# Patient Record
Sex: FEMALE | Race: WHITE | NOT HISPANIC OR LATINO | URBAN - METROPOLITAN AREA
[De-identification: names, ages, dates, MRNs, and addresses within clinical notes are randomized per-mention and may not be internally consistent; named-entity substitution may affect disease eponyms.]

---

## 2017-02-22 ENCOUNTER — APPOINTMENT (RX ONLY)
Dept: URBAN - METROPOLITAN AREA CLINIC 22 | Facility: CLINIC | Age: 52
Setting detail: DERMATOLOGY
End: 2017-02-22

## 2017-02-22 DIAGNOSIS — Z41.9 ENCOUNTER FOR PROCEDURE FOR PURPOSES OTHER THAN REMEDYING HEALTH STATE, UNSPECIFIED: ICD-10-CM

## 2017-02-22 PROCEDURE — ? FILLERS

## 2017-02-22 PROCEDURE — BOTOX: HCPCS

## 2017-02-22 PROCEDURE — ? BOTOX

## 2017-02-22 ASSESSMENT — LOCATION DETAILED DESCRIPTION DERM
LOCATION DETAILED: RIGHT CENTRAL MALAR CHEEK
LOCATION DETAILED: RIGHT SUPERIOR ANTERIOR NECK
LOCATION DETAILED: LEFT CENTRAL MALAR CHEEK

## 2017-02-22 ASSESSMENT — LOCATION ZONE DERM
LOCATION ZONE: FACE
LOCATION ZONE: NECK

## 2017-02-22 ASSESSMENT — LOCATION SIMPLE DESCRIPTION DERM
LOCATION SIMPLE: RIGHT CHEEK
LOCATION SIMPLE: RIGHT ANTERIOR NECK
LOCATION SIMPLE: LEFT CHEEK

## 2017-02-22 NOTE — PROCEDURE: FILLERS
Mid Face Filler Volume In Cc: 0
Additional Area 1 Volume In Cc: 0.3
Lot #: [id number]
Administered By (Optional): Dr. Park
Price $ (Numeric Only, No Text Please.): 850.00
Filler: Juvederm Voluma XC
Additional Area 2 Location: left malar region
Additional Area 1 Location: right malar region
Show Price In Note?: yes
Detail Level: Simple
Consent: Written consent obtained. Risks include but not limited to bruising, beading, irregular texture, ulceration, infection, allergic reaction, scar formation, incomplete augmentation, temporary nature, procedural pain.
Use Map Statement For Sites (Optional): No
Expiration Date (Month Year): 06/2018

## 2017-02-22 NOTE — PROCEDURE: BOTOX
Perioral Units: 0
Administered By (Optional): Paul Park
Additional Area 1 Units: 7.5
Summary Price $ (Use Numbers Only, No Text Please.): 97.50
Detail Level: Simple
Postcare Instructions: Patient instructed to not lie down for 4 hours and limit physical activity for 24 hours. Patient instructed not to travel by airplane for 48 hours.
Use Map Statement For Sites (Optional): No
Additional Area 1 Location: neck
Dilution (U/0.1 Cc): 4
Reconstitution Date: 02/22/2017
Bill Summary Price Listed Below, Or Bill Total Of Units X Price Per Unit?: Bill Summary Price Below
Price Per Unit In $ (Use Numbers Only, No Text Please.): 13.00
Show Price In Note?: yes
Consent: Written consent was obtained prior to the procedure. Risks, benefits, expectations and alternatives were discussed including, but not limited to, infection, bleeding, lid/brow ptosis, bruising, swelling, diplopia, temporary effects, incomplete chemical denervation and dissatisfaction with the cosmetic outcome. No guarantee or warranty was given or implied regarding longevity of results.

## 2017-05-17 ENCOUNTER — APPOINTMENT (RX ONLY)
Dept: URBAN - METROPOLITAN AREA CLINIC 22 | Facility: CLINIC | Age: 52
Setting detail: DERMATOLOGY
End: 2017-05-17

## 2017-05-17 DIAGNOSIS — Z41.9 ENCOUNTER FOR PROCEDURE FOR PURPOSES OTHER THAN REMEDYING HEALTH STATE, UNSPECIFIED: ICD-10-CM

## 2017-05-17 PROCEDURE — ? FILLERS

## 2017-05-17 PROCEDURE — 99024 POSTOP FOLLOW-UP VISIT: CPT

## 2017-05-17 ASSESSMENT — LOCATION SIMPLE DESCRIPTION DERM: LOCATION SIMPLE: LEFT CHEEK

## 2017-05-17 ASSESSMENT — LOCATION DETAILED DESCRIPTION DERM: LOCATION DETAILED: LEFT CENTRAL MALAR CHEEK

## 2017-05-17 ASSESSMENT — LOCATION ZONE DERM: LOCATION ZONE: FACE

## 2017-05-17 NOTE — PROCEDURE: FILLERS
Decollete Filler Volume In Cc: 0
Price $ (Numeric Only, No Text Please.): 0.00
Administered By (Optional): Dr. Park
Topical Anesthetic #1: lidocaine
Show Price In Note?: yes
Filler: Juvederm Voluma XC
Detail Level: Simple
Use Map Statement For Sites (Optional): No
Topical Anesthetic #2: prilocaine
Injected Anesthesia In Cc: 0.3
Topical Anesthetic Base: ointment
Consent: Written consent obtained. Risks include but not limited to bruising, beading, irregular texture, ulceration, infection, allergic reaction, scar formation, incomplete augmentation, temporary nature, procedural pain.
Topical Anesthetic #3: phenenylephrine
Additional Area 1 Location: left cheek

## 2017-09-12 ENCOUNTER — APPOINTMENT (RX ONLY)
Dept: URBAN - METROPOLITAN AREA CLINIC 22 | Facility: CLINIC | Age: 52
Setting detail: DERMATOLOGY
End: 2017-09-12

## 2017-09-12 DIAGNOSIS — Z41.9 ENCOUNTER FOR PROCEDURE FOR PURPOSES OTHER THAN REMEDYING HEALTH STATE, UNSPECIFIED: ICD-10-CM

## 2017-09-12 PROCEDURE — ? FILLERS

## 2017-09-12 ASSESSMENT — LOCATION DETAILED DESCRIPTION DERM
LOCATION DETAILED: RIGHT CENTRAL MALAR CHEEK
LOCATION DETAILED: LEFT CENTRAL MALAR CHEEK

## 2017-09-12 ASSESSMENT — LOCATION SIMPLE DESCRIPTION DERM
LOCATION SIMPLE: RIGHT CHEEK
LOCATION SIMPLE: LEFT CHEEK

## 2017-09-12 ASSESSMENT — LOCATION ZONE DERM: LOCATION ZONE: FACE

## 2018-05-04 ENCOUNTER — APPOINTMENT (RX ONLY)
Dept: URBAN - METROPOLITAN AREA CLINIC 22 | Facility: CLINIC | Age: 53
Setting detail: DERMATOLOGY
End: 2018-05-04

## 2018-05-04 DIAGNOSIS — Z41.1 ENCOUNTER FOR COSMETIC SURGERY: ICD-10-CM

## 2018-05-04 DIAGNOSIS — Z41.9 ENCOUNTER FOR PROCEDURE FOR PURPOSES OTHER THAN REMEDYING HEALTH STATE, UNSPECIFIED: ICD-10-CM

## 2018-05-04 PROCEDURE — Z1031: HCPCS

## 2018-05-04 PROCEDURE — ? CONSULTATION - LIPOSUCTION

## 2018-05-04 PROCEDURE — ? PATIENT SPECIFIC COUNSELING

## 2018-05-04 PROCEDURE — ? FILLERS

## 2018-05-04 PROCEDURE — ? ORDER FOR SURGERY

## 2018-05-04 ASSESSMENT — LOCATION ZONE DERM: LOCATION ZONE: FACE

## 2018-05-04 ASSESSMENT — LOCATION SIMPLE DESCRIPTION DERM
LOCATION SIMPLE: RIGHT CHEEK
LOCATION SIMPLE: LEFT CHEEK

## 2018-05-04 ASSESSMENT — LOCATION DETAILED DESCRIPTION DERM
LOCATION DETAILED: RIGHT MEDIAL MALAR CHEEK
LOCATION DETAILED: LEFT MEDIAL MALAR CHEEK

## 2018-05-04 NOTE — PROCEDURE: FILLERS
Marionette Lines Filler Volume In Cc: 0
Expiration Date (Month Year): 6/11/2019
Topical Anesthesia?: no
Consent: Written consent obtained. Risks include but not limited to bruising, beading, irregular texture, ulceration, infection, allergic reaction, scar formation, incomplete augmentation, temporary nature, procedural pain.
Lot #: [id number]
Filler: Juvederm Voluma XC
Additional Area 1 Volume In Cc: 0.3
Detail Level: Simple
Additional Area 2 Location: Left malar cheek
Show Price In Note?: yes
Topical Anesthetic Base: ointment
Additional Area 1 Location: Right malar cheek
Price $ (Numeric Only, No Text Please.): 164
Additional Area 2 Volume In Cc: 0.5
Administered By (Optional): Dr. Park

## 2018-05-04 NOTE — PROCEDURE: ORDER FOR SURGERY
Estimated Length Of Surgery: 1 hour
Date Of Surgery: TBD
Comments (Optional): $3,000
Detail Level: Simple
Surgery To Be Ordered: Liposuction of chin and neck
Admission Status: in office
Cosmetic, Major Or Minor?: Cosmetic
Surgeon: Dr. Park
Facility: [facility]

## 2018-05-04 NOTE — PROCEDURE: PATIENT SPECIFIC COUNSELING
Detail Level: Detailed
Other (Free Text): Physician discussed performing liposuction in office under local anesthesia.  Physician recommended patient take Valium before procedure after consents signed and also recommended patient have percocet rx for post-op pain management.

## 2019-10-25 ENCOUNTER — APPOINTMENT (RX ONLY)
Dept: URBAN - METROPOLITAN AREA CLINIC 11 | Facility: CLINIC | Age: 54
Setting detail: DERMATOLOGY
End: 2019-10-25

## 2019-10-25 DIAGNOSIS — Z41.9 ENCOUNTER FOR PROCEDURE FOR PURPOSES OTHER THAN REMEDYING HEALTH STATE, UNSPECIFIED: ICD-10-CM

## 2019-10-25 PROCEDURE — Z1031: HCPCS

## 2019-10-25 PROCEDURE — ? FILLERS

## 2019-10-25 ASSESSMENT — LOCATION SIMPLE DESCRIPTION DERM
LOCATION SIMPLE: RIGHT CHEEK
LOCATION SIMPLE: LEFT CHEEK

## 2019-10-25 ASSESSMENT — LOCATION DETAILED DESCRIPTION DERM
LOCATION DETAILED: LEFT CENTRAL MALAR CHEEK
LOCATION DETAILED: RIGHT CENTRAL MALAR CHEEK

## 2019-10-25 ASSESSMENT — LOCATION ZONE DERM: LOCATION ZONE: FACE

## 2019-10-25 NOTE — PROCEDURE: FILLERS
Vermillion Lips Filler Volume In Cc: 0
Show Price In Note?: yes
Expiration Date (Month Year): 2020-12-15
Topical Anesthetic Base: ointment
Price $ (Numeric Only, No Text Please.): 850.00
Topical Anesthetic #1: lidocaine
Use Map Statement For Sites (Optional): No
Consent: Written consent obtained. Risks include but not limited to bruising, beading, irregular texture, ulceration, infection, allergic reaction, scar formation, incomplete augmentation, temporary nature, procedural pain.
Filler: Juvederm Voluma XC
Topical Anesthetic #2: prilocaine
Additional Area 1 Location: right cheek
Lot #: [id number]
Additional Area 1 Volume In Cc: 0.4
Detail Level: Simple
Topical Anesthetic Time (In Min): 5
Additional Area 2 Location: left cheek
Topical Anesthetic #3: phenenylephrine
Administered By (Optional): Dr. Park

## 2021-06-24 ENCOUNTER — APPOINTMENT (RX ONLY)
Dept: URBAN - METROPOLITAN AREA CLINIC 11 | Facility: CLINIC | Age: 56
Setting detail: DERMATOLOGY
End: 2021-06-24

## 2021-06-24 DIAGNOSIS — Z41.1 ENCOUNTER FOR COSMETIC SURGERY: ICD-10-CM

## 2021-06-24 DIAGNOSIS — Z41.9 ENCOUNTER FOR PROCEDURE FOR PURPOSES OTHER THAN REMEDYING HEALTH STATE, UNSPECIFIED: ICD-10-CM

## 2021-06-24 PROCEDURE — Z1024: HCPCS

## 2021-06-24 PROCEDURE — ? FILLERS

## 2021-06-24 PROCEDURE — ? BOTOX

## 2021-06-24 PROCEDURE — ? CONSULTATION - AESTHETIC FACIAL DEFORMITY

## 2021-06-24 ASSESSMENT — LOCATION DETAILED DESCRIPTION DERM
LOCATION DETAILED: RIGHT INFERIOR MEDIAL MALAR CHEEK
LOCATION DETAILED: GLABELLA
LOCATION DETAILED: RIGHT CENTRAL MALAR CHEEK
LOCATION DETAILED: LEFT CENTRAL MALAR CHEEK

## 2021-06-24 ASSESSMENT — LOCATION SIMPLE DESCRIPTION DERM
LOCATION SIMPLE: RIGHT CHEEK
LOCATION SIMPLE: LEFT CHEEK
LOCATION SIMPLE: GLABELLA

## 2021-06-24 ASSESSMENT — LOCATION ZONE DERM: LOCATION ZONE: FACE

## 2021-06-24 NOTE — PROCEDURE: FILLERS
Lateral Canthal Filler Volume In Cc: 0
Additional Area 2 Location: left cheek
Topical Anesthetic Base: ointment
Detail Level: Simple
Consent: Written consent obtained. Risks include but not limited to bruising, beading, irregular texture, ulceration, infection, allergic reaction, scar formation, incomplete augmentation, temporary nature, procedural pain.
Topical Anesthesia?: no
Additional Area 2 Volume In Cc: 0.3
Expiration Date (Month Year): 2021-11-22
Administered By (Optional): Dr. Park
Show Price In Note?: yes
Price $ (Numeric Only, No Text Please.): 663
Filler: Juvederm Voluma XC
Injected Anesthesia In Cc: 0.6
Lot #: [id number]
Additional Area 1 Location: right cheek

## 2021-06-24 NOTE — PROCEDURE: BOTOX
Ethan Units: 0
Price Per Unit In $ (Use Numbers Only, No Text Please.): 14.00
Consent: Written consent was obtained prior to the procedure. Risks, benefits, expectations and alternatives were discussed including, but not limited to, infection, bleeding, lid/brow ptosis, bruising, swelling, diplopia, temporary effects, incomplete chemical denervation and dissatisfaction with the cosmetic outcome. No guarantee or warranty was given or implied regarding longevity of results.
Dilution (U/0.1 Cc): 2.5
Glabellar Complex Units: 12.5
Use Map Statement For Sites (Optional): No
Postcare Instructions: Patient instructed to not lie down for 4 hours and limit physical activity for 24 hours. Patient instructed not to travel by airplane for 48 hours.
Reconstitution Date: 06/24/2021
Show Price In Note?: yes
Administered By (Optional): Paul Park
Summary Price $ (Use Numbers Only, No Text Please.): 175
Detail Level: Simple
Bill Summary Price Listed Below, Or Bill Total Of Units X Price Per Unit?: Bill Summary Price Below

## 2021-07-22 ENCOUNTER — APPOINTMENT (RX ONLY)
Dept: URBAN - METROPOLITAN AREA CLINIC 11 | Facility: CLINIC | Age: 56
Setting detail: DERMATOLOGY
End: 2021-07-22

## 2021-07-22 DIAGNOSIS — Z41.9 ENCOUNTER FOR PROCEDURE FOR PURPOSES OTHER THAN REMEDYING HEALTH STATE, UNSPECIFIED: ICD-10-CM

## 2021-07-22 PROCEDURE — ? FILLERS

## 2021-07-22 PROCEDURE — ? BOTOX

## 2021-07-22 PROCEDURE — Z1024: HCPCS

## 2021-07-22 ASSESSMENT — LOCATION DETAILED DESCRIPTION DERM
LOCATION DETAILED: RIGHT CENTRAL MALAR CHEEK
LOCATION DETAILED: RIGHT CENTRAL ANTERIOR NECK
LOCATION DETAILED: LEFT CENTRAL MALAR CHEEK

## 2021-07-22 ASSESSMENT — LOCATION SIMPLE DESCRIPTION DERM
LOCATION SIMPLE: NECK
LOCATION SIMPLE: LEFT CHEEK
LOCATION SIMPLE: RIGHT CHEEK

## 2021-07-22 ASSESSMENT — LOCATION ZONE DERM
LOCATION ZONE: FACE
LOCATION ZONE: NECK

## 2021-07-22 NOTE — PROCEDURE: FILLERS
Mid Face Filler Volume In Cc: 0
Consent: Written consent obtained. Risks include but not limited to bruising, beading, irregular texture, ulceration, infection, allergic reaction, scar formation, incomplete augmentation, temporary nature, procedural pain.
Use Map Statement For Sites (Optional): No
Cheeks Filler Volume In Cc: 0.3
Lot #: [id number]
Show Price In Note?: yes
Administered By (Optional): Dr. Park
Filler: Juvederm Voluma XC
Detail Level: Simple
Expiration Date (Month Year): 11/22/21
Topical Anesthetic Base: ointment

## 2021-07-22 NOTE — PROCEDURE: BOTOX
Periorbital Skin Units: 0
Show Price In Note?: yes
Postcare Instructions: Patient instructed to not lie down for 4 hours and limit physical activity for 24 hours. Patient instructed not to travel by airplane for 48 hours.
Detail Level: Simple
Administered By (Optional): Paul Park
Platsyma Units: 13
Use Map Statement For Sites (Optional): No
Reconstitution Date: 07/22/2021
Summary Price $ (Use Numbers Only, No Text Please.): 182
Lot #: [id number]
Expiration Date (Month Year): 09/2023
Consent: Written consent was obtained prior to the procedure. Risks, benefits, expectations and alternatives were discussed including, but not limited to, infection, bleeding, lid/brow ptosis, bruising, swelling, diplopia, temporary effects, incomplete chemical denervation and dissatisfaction with the cosmetic outcome. No guarantee or warranty was given or implied regarding longevity of results.
Dilution (U/0.1 Cc): 2.5
Bill Summary Price Listed Below, Or Bill Total Of Units X Price Per Unit?: Bill Summary Price Below
Price Per Unit In $ (Use Numbers Only, No Text Please.): 14

## 2022-05-26 ENCOUNTER — APPOINTMENT (RX ONLY)
Dept: URBAN - METROPOLITAN AREA CLINIC 11 | Facility: CLINIC | Age: 57
Setting detail: DERMATOLOGY
End: 2022-05-26

## 2022-05-26 DIAGNOSIS — Z41.9 ENCOUNTER FOR PROCEDURE FOR PURPOSES OTHER THAN REMEDYING HEALTH STATE, UNSPECIFIED: ICD-10-CM

## 2022-05-26 PROCEDURE — ? BOTOX

## 2022-05-26 PROCEDURE — ? FILLERS

## 2022-05-26 PROCEDURE — Z1024: HCPCS

## 2022-05-26 ASSESSMENT — LOCATION SIMPLE DESCRIPTION DERM
LOCATION SIMPLE: RIGHT ANTERIOR NECK
LOCATION SIMPLE: LEFT EYEBROW
LOCATION SIMPLE: LEFT ANTERIOR NECK
LOCATION SIMPLE: LEFT TEMPLE
LOCATION SIMPLE: LEFT CHEEK
LOCATION SIMPLE: RIGHT EYEBROW
LOCATION SIMPLE: RIGHT TEMPLE
LOCATION SIMPLE: RIGHT CHEEK

## 2022-05-26 ASSESSMENT — LOCATION DETAILED DESCRIPTION DERM
LOCATION DETAILED: RIGHT CENTRAL MALAR CHEEK
LOCATION DETAILED: LEFT CENTRAL MALAR CHEEK
LOCATION DETAILED: LEFT SUPERIOR ANTERIOR NECK
LOCATION DETAILED: RIGHT LATERAL EYEBROW
LOCATION DETAILED: RIGHT INFERIOR TEMPLE
LOCATION DETAILED: RIGHT SUPERIOR ANTERIOR NECK
LOCATION DETAILED: LEFT INFERIOR TEMPLE
LOCATION DETAILED: LEFT LATERAL EYEBROW

## 2022-05-26 ASSESSMENT — LOCATION ZONE DERM
LOCATION ZONE: FACE
LOCATION ZONE: NECK

## 2022-05-26 NOTE — PROCEDURE: BOTOX
Perioral Units: 0
Lot #:  [id number]
Use Map Statement For Sites (Optional): No
Summary Price $ (Use Numbers Only, No Text Please.): 280
Platsyma Units: 20
Reconstitution Date: 05/19/2022
Postcare Instructions: Patient instructed to not lie down for 4 hours and limit physical activity for 24 hours. Patient instructed not to travel by airplane for 48 hours.
Administered By (Optional): Paul Park
Detail Level: Simple
Bill Summary Price Listed Below, Or Bill Total Of Units X Price Per Unit?: Bill Summary Price Below
Show Price In Note?: yes
Price Per Unit In $ (Use Numbers Only, No Text Please.): 14.00
Dilution (U/0.1 Cc): 2.5
Consent: Written consent was obtained prior to the procedure. Risks, benefits, expectations and alternatives were discussed including, but not limited to, infection, bleeding, lid/brow ptosis, bruising, swelling, diplopia, temporary effects, incomplete chemical denervation and dissatisfaction with the cosmetic outcome. No guarantee or warranty was given or implied regarding longevity of results.
Expiration Date (Month Year): 10/2023

## 2022-07-06 NOTE — PROCEDURE: FILLERS
Mental Crease Filler Volume In Cc: 0
Topical Anesthetic #1: lidocaine
Additional Area 2 Volume In Cc: 0.4
Consent: Written consent obtained. Risks include but not limited to bruising, beading, irregular texture, ulceration, infection, allergic reaction, scar formation, incomplete augmentation, temporary nature, procedural pain.
Filler: Juvederm Voluma XC
Administered By (Optional): Dr. Park
Detail Level: Simple
Yes
Show Price In Note?: yes
Topical Anesthetic #3: phenenylephrine
Additional Area 1 Location: right malar region
Topical Anesthetic Base: ointment
Injected Anesthesia In Cc: 0.3
Expiration Date (Month Year): 11/02/2018
Topical Anesthetic #2: prilocaine
Lot #: [id number]
Additional Area 2 Location: left malar region
Price $ (Numeric Only, No Text Please.): 850.00
Use Map Statement For Sites (Optional): No

## 2022-09-22 ENCOUNTER — APPOINTMENT (RX ONLY)
Dept: URBAN - METROPOLITAN AREA CLINIC 11 | Facility: CLINIC | Age: 57
Setting detail: DERMATOLOGY
End: 2022-09-22

## 2022-09-22 DIAGNOSIS — Z41.9 ENCOUNTER FOR PROCEDURE FOR PURPOSES OTHER THAN REMEDYING HEALTH STATE, UNSPECIFIED: ICD-10-CM

## 2022-09-22 PROCEDURE — ? BOTOX

## 2022-09-22 PROCEDURE — ? FILLERS

## 2022-09-22 PROCEDURE — BOTOX: HCPCS

## 2022-09-22 ASSESSMENT — LOCATION SIMPLE DESCRIPTION DERM
LOCATION SIMPLE: LEFT EYEBROW
LOCATION SIMPLE: RIGHT EYEBROW
LOCATION SIMPLE: RIGHT CHEEK
LOCATION SIMPLE: GLABELLA
LOCATION SIMPLE: LEFT CHEEK

## 2022-09-22 ASSESSMENT — LOCATION DETAILED DESCRIPTION DERM
LOCATION DETAILED: RIGHT CENTRAL MALAR CHEEK
LOCATION DETAILED: LEFT LATERAL EYEBROW
LOCATION DETAILED: LEFT CENTRAL MALAR CHEEK
LOCATION DETAILED: RIGHT LATERAL EYEBROW
LOCATION DETAILED: GLABELLA

## 2022-09-22 ASSESSMENT — LOCATION ZONE DERM: LOCATION ZONE: FACE

## 2022-09-22 NOTE — PROCEDURE: FILLERS
Lower Lips Filler Volume In Cc: 0
Price $ (Numeric Only, No Text Please.): 750
Detail Level: Simple
Topical Anesthetic Time (In Min): 7
Administered By (Optional): Dr. Park
Injected Anesthesia In Cc: 0.3
Consent: Written consent obtained. Risks include but not limited to bruising, beading, irregular texture, ulceration, infection, allergic reaction, scar formation, incomplete augmentation, temporary nature, procedural pain.
Show Price In Note?: yes
Filler: Restylane-L
Additional Area 1 Location: R cheek
Lot #: [id number]
Additional Area 1 Volume In Cc: 0.2
Use Map Statement For Sites (Optional): No
Additional Area 2 Location: L cheek
Topical Anesthetic Base: ointment
Expiration Date (Month Year): 11/30/2024

## 2022-09-22 NOTE — PROCEDURE: BOTOX
Reconstitution Date: 09/22/2022
Summary Price $ (Use Numbers Only, No Text Please.): 252
Detail Level: Simple
Platsyma Units: 0
Postcare Instructions: Patient instructed to not lie down for 4 hours and limit physical activity for 24 hours. Patient instructed not to travel by airplane for 48 hours.
Additional Area 1 Units: 2.5
Administered By (Optional): Paul Park
Additional Area 2 Location: L lat brow
Use Map Statement For Sites (Optional): No
Lot #: [id number]
Glabellar Complex Units: 12.5
Consent: Written consent was obtained prior to the procedure. Risks, benefits, expectations and alternatives were discussed including, but not limited to, infection, bleeding, lid/brow ptosis, bruising, swelling, diplopia, temporary effects, incomplete chemical denervation and dissatisfaction with the cosmetic outcome. No guarantee or warranty was given or implied regarding longevity of results.
Additional Area 1 Location: R lat brow
Expiration Date (Month Year): 10/2023
Bill Summary Price Listed Below, Or Bill Total Of Units X Price Per Unit?: Bill Summary Price Below
Show Price In Note?: yes
Price Per Unit In $ (Use Numbers Only, No Text Please.): 14.00

## 2023-02-23 ENCOUNTER — APPOINTMENT (RX ONLY)
Dept: URBAN - METROPOLITAN AREA CLINIC 11 | Facility: CLINIC | Age: 58
Setting detail: DERMATOLOGY
End: 2023-02-23

## 2023-02-23 DIAGNOSIS — Z41.9 ENCOUNTER FOR PROCEDURE FOR PURPOSES OTHER THAN REMEDYING HEALTH STATE, UNSPECIFIED: ICD-10-CM

## 2023-02-23 PROCEDURE — Z1024: HCPCS

## 2023-02-23 PROCEDURE — ? BOTOX

## 2023-02-23 ASSESSMENT — LOCATION DETAILED DESCRIPTION DERM
LOCATION DETAILED: GLABELLA
LOCATION DETAILED: LEFT LATERAL EYEBROW
LOCATION DETAILED: RIGHT LATERAL EYEBROW

## 2023-02-23 ASSESSMENT — LOCATION SIMPLE DESCRIPTION DERM
LOCATION SIMPLE: LEFT EYEBROW
LOCATION SIMPLE: GLABELLA
LOCATION SIMPLE: RIGHT EYEBROW

## 2023-02-23 ASSESSMENT — LOCATION ZONE DERM: LOCATION ZONE: FACE

## 2023-02-23 NOTE — PROCEDURE: BOTOX
Reconstitution Date: 02/09/2023
Summary Price $ (Use Numbers Only, No Text Please.): 322
Detail Level: Simple
Platsyma Units: 0
Postcare Instructions: Patient instructed to not lie down for 4 hours and limit physical activity for 24 hours. Patient instructed not to travel by airplane for 48 hours.
Additional Area 1 Units: 2.5
Administered By (Optional): Paul Park
Additional Area 3 Location: right platysmal
Additional Area 2 Location: L lat brow
Use Map Statement For Sites (Optional): No
Lot #: [id number]
Glabellar Complex Units: 12.5
Consent: Written consent was obtained prior to the procedure. Risks, benefits, expectations and alternatives were discussed including, but not limited to, infection, bleeding, lid/brow ptosis, bruising, swelling, diplopia, temporary effects, incomplete chemical denervation and dissatisfaction with the cosmetic outcome. No guarantee or warranty was given or implied regarding longevity of results.
Additional Area 1 Location: R lat brow
Expiration Date (Month Year): 02/2025
Additional Area 3 Units: 5
Bill Summary Price Listed Below, Or Bill Total Of Units X Price Per Unit?: Bill Summary Price Below
Show Price In Note?: yes
Price Per Unit In $ (Use Numbers Only, No Text Please.): 14.00

## 2023-02-23 NOTE — HPI: COSMETIC (BOTOX)
Have You Had Botox?: has had botox
Have You Had Dysport?: not pertinent
When Was Your Last Botox Treatment?: 09/22/2022

## 2023-05-25 ENCOUNTER — APPOINTMENT (RX ONLY)
Dept: URBAN - METROPOLITAN AREA CLINIC 11 | Facility: CLINIC | Age: 58
Setting detail: DERMATOLOGY
End: 2023-05-25

## 2023-05-25 DIAGNOSIS — Z41.9 ENCOUNTER FOR PROCEDURE FOR PURPOSES OTHER THAN REMEDYING HEALTH STATE, UNSPECIFIED: ICD-10-CM

## 2023-05-25 PROCEDURE — ? FILLERS

## 2023-05-25 ASSESSMENT — LOCATION DETAILED DESCRIPTION DERM
LOCATION DETAILED: RIGHT CENTRAL MALAR CHEEK
LOCATION DETAILED: LEFT CENTRAL MALAR CHEEK

## 2023-05-25 ASSESSMENT — LOCATION SIMPLE DESCRIPTION DERM
LOCATION SIMPLE: LEFT CHEEK
LOCATION SIMPLE: RIGHT CHEEK

## 2023-05-25 ASSESSMENT — LOCATION ZONE DERM: LOCATION ZONE: FACE

## 2023-05-25 NOTE — PROCEDURE: FILLERS
Lower Lips Filler Volume In Cc: 0
Price $ (Numeric Only, No Text Please.): 925
Detail Level: Simple
Administered By (Optional): Dr. Park
Injected Anesthesia In Cc: 0.3
Consent: Written consent obtained. Risks include but not limited to bruising, beading, irregular texture, ulceration, infection, allergic reaction, scar formation, incomplete augmentation, temporary nature, procedural pain.
Show Price In Note?: yes
Filler: Tenisha Bernal (Perlane-L)
Additional Area 1 Location: R cheek
Lot #: [id number]
Additional Area 1 Volume In Cc: 0.5
Use Map Statement For Sites (Optional): No
Additional Area 2 Location: L cheek
Topical Anesthetic Base: ointment
Expiration Date (Month Year): 05/31/2025

## 2023-06-29 ENCOUNTER — APPOINTMENT (RX ONLY)
Dept: URBAN - METROPOLITAN AREA CLINIC 11 | Facility: CLINIC | Age: 58
Setting detail: DERMATOLOGY
End: 2023-06-29

## 2023-06-29 DIAGNOSIS — Z41.9 ENCOUNTER FOR PROCEDURE FOR PURPOSES OTHER THAN REMEDYING HEALTH STATE, UNSPECIFIED: ICD-10-CM

## 2023-06-29 PROCEDURE — ? BOTOX

## 2023-06-29 PROCEDURE — Z1024: HCPCS

## 2023-06-29 ASSESSMENT — LOCATION SIMPLE DESCRIPTION DERM
LOCATION SIMPLE: RIGHT EYEBROW
LOCATION SIMPLE: LEFT EYEBROW
LOCATION SIMPLE: SUBMENTAL CHIN
LOCATION SIMPLE: GLABELLA

## 2023-06-29 ASSESSMENT — LOCATION DETAILED DESCRIPTION DERM
LOCATION DETAILED: RIGHT LATERAL EYEBROW
LOCATION DETAILED: LEFT LATERAL EYEBROW
LOCATION DETAILED: SUBMENTAL CHIN
LOCATION DETAILED: GLABELLA

## 2023-06-29 ASSESSMENT — LOCATION ZONE DERM
LOCATION ZONE: FACE
LOCATION ZONE: FACE

## 2023-06-29 NOTE — PROCEDURE: BOTOX
Use Map Statement For Sites (Optional): No
Lot #: [id number]
Perioral Units: 0
Show Price In Note?: yes
Detail Level: Simple
Summary Price $ (Use Numbers Only, No Text Please.): 322
Platsyma Units: 5
Postcare Instructions: Patient instructed to not lie down for 4 hours and limit physical activity for 24 hours. Patient instructed not to travel by airplane for 48 hours.
Additional Area 1 Units: 2.5
Administered By (Optional): Paul Park
Reconstitution Date: 06/29/2023
Bill Summary Price Listed Below, Or Bill Total Of Units X Price Per Unit?: Bill Summary Price Below
Price Per Unit In $ (Use Numbers Only, No Text Please.): 13.00
Additional Area 2 Location: L lat brow
Expiration Date (Month Year): 2025/05
Glabellar Complex Units: 12.5
Consent: Written consent was obtained prior to the procedure. Risks, benefits, expectations and alternatives were discussed including, but not limited to, infection, bleeding, lid/brow ptosis, bruising, swelling, diplopia, temporary effects, incomplete chemical denervation and dissatisfaction with the cosmetic outcome. No guarantee or warranty was given or implied regarding longevity of results.
Additional Area 1 Location: R lat brow

## 2023-11-30 ENCOUNTER — APPOINTMENT (RX ONLY)
Dept: URBAN - METROPOLITAN AREA CLINIC 11 | Facility: CLINIC | Age: 58
Setting detail: DERMATOLOGY
End: 2023-11-30

## 2023-11-30 DIAGNOSIS — Z41.9 ENCOUNTER FOR PROCEDURE FOR PURPOSES OTHER THAN REMEDYING HEALTH STATE, UNSPECIFIED: ICD-10-CM

## 2023-11-30 PROCEDURE — ? FILLERS

## 2023-11-30 ASSESSMENT — LOCATION DETAILED DESCRIPTION DERM
LOCATION DETAILED: LEFT LATERAL MALAR CHEEK
LOCATION DETAILED: RIGHT LATERAL MALAR CHEEK

## 2023-11-30 ASSESSMENT — LOCATION SIMPLE DESCRIPTION DERM
LOCATION SIMPLE: LEFT CHEEK
LOCATION SIMPLE: RIGHT CHEEK

## 2023-11-30 ASSESSMENT — LOCATION ZONE DERM: LOCATION ZONE: FACE

## 2023-11-30 NOTE — PROCEDURE: FILLERS
Additional Area 1 Volume In Cc: 0
Topical Anesthetic Base: ointment
Lot #: [id number]
Topical Anesthetic #1: lidocaine
Detail Level: Simple
Injected Anesthesia In Cc: 0.2
Consent: Written consent obtained. Risks include but not limited to bruising, beading, irregular texture, ulceration, infection, allergic reaction, scar formation, incomplete augmentation, temporary nature, procedural pain.
Topical Anesthesia?: yes
Administered By (Optional): Dr. Park
Filler: Juvederm Voluma XC
Expiration Date (Month Year): 02/03/2024
Additional Area 1 Location: right cheek
Topical Anesthetic Time (In Min): 10
Price $ (Numeric Only, No Text Please.): 570
Additional Area 1 Volume In Cc: 0.4
Use Map Statement For Sites (Optional): No
Additional Area 2 Location: left cheek

## 2024-04-18 ENCOUNTER — APPOINTMENT (RX ONLY)
Dept: URBAN - METROPOLITAN AREA CLINIC 11 | Facility: CLINIC | Age: 59
Setting detail: DERMATOLOGY
End: 2024-04-18

## 2024-04-18 DIAGNOSIS — Z41.9 ENCOUNTER FOR PROCEDURE FOR PURPOSES OTHER THAN REMEDYING HEALTH STATE, UNSPECIFIED: ICD-10-CM

## 2024-04-18 PROCEDURE — ? FILLERS

## 2024-04-18 PROCEDURE — ? BOTOX

## 2024-04-18 PROCEDURE — BOTOX: HCPCS

## 2024-04-18 ASSESSMENT — LOCATION SIMPLE DESCRIPTION DERM
LOCATION SIMPLE: LEFT CHEEK
LOCATION SIMPLE: RIGHT CHEEK
LOCATION SIMPLE: LEFT TEMPLE
LOCATION SIMPLE: GLABELLA
LOCATION SIMPLE: RIGHT TEMPLE

## 2024-04-18 ASSESSMENT — LOCATION DETAILED DESCRIPTION DERM
LOCATION DETAILED: RIGHT INFERIOR TEMPLE
LOCATION DETAILED: LEFT INFERIOR TEMPLE
LOCATION DETAILED: RIGHT CENTRAL MALAR CHEEK
LOCATION DETAILED: LEFT CENTRAL MALAR CHEEK
LOCATION DETAILED: GLABELLA

## 2024-04-18 ASSESSMENT — LOCATION ZONE DERM: LOCATION ZONE: FACE

## 2024-04-18 NOTE — PROCEDURE: FILLERS
Lower Lips Filler Volume In Cc: 0
Price $ (Numeric Only, No Text Please.): 570
Detail Level: Simple
Administered By (Optional): Dr. Park
Injected Anesthesia In Cc: 0.3
Consent: Written consent obtained. Risks include but not limited to bruising, beading, irregular texture, ulceration, infection, allergic reaction, scar formation, incomplete augmentation, temporary nature, procedural pain.
Show Price In Note?: yes
Filler: Tenisha Bernal (Perlane-L)
Additional Area 1 Location: R cheek
Lot #: [id number]
Additional Area 1 Volume In Cc: 0.5
Use Map Statement For Sites (Optional): No
Additional Area 2 Location: L cheek
Topical Anesthetic Base: ointment
Expiration Date (Month Year): 2025-05-31

## 2024-04-18 NOTE — PROCEDURE: BOTOX
Detail Level: Simple
Summary Price $ (Use Numbers Only, No Text Please.): 252
Platsyma Units: 0
Additional Area 1 Units: 5
Administered By (Optional): Paul Park
Reconstitution Date: 04/18/2024
Postcare Instructions: Patient instructed to not lie down for 4 hours and limit physical activity for 24 hours. Patient instructed not to travel by airplane for 48 hours.
Show Price In Note?: yes
Bill Summary Price Listed Below, Or Bill Total Of Units X Price Per Unit?: Bill Summary Price Below
Consent: Written consent was obtained prior to the procedure. Risks, benefits, expectations and alternatives were discussed including, but not limited to, infection, bleeding, lid/brow ptosis, bruising, swelling, diplopia, temporary effects, incomplete chemical denervation and dissatisfaction with the cosmetic outcome. No guarantee or warranty was given or implied regarding longevity of results.
Dilution (U/0.1 Cc): 2.5
Price Per Unit In $ (Use Numbers Only, No Text Please.): 14.00
Additional Area 1 Location: periorbital
Use Map Statement For Sites (Optional): No
Glabellar Complex Units: 12.5